# Patient Record
Sex: MALE | Race: WHITE | NOT HISPANIC OR LATINO | URBAN - METROPOLITAN AREA
[De-identification: names, ages, dates, MRNs, and addresses within clinical notes are randomized per-mention and may not be internally consistent; named-entity substitution may affect disease eponyms.]

---

## 2023-07-05 ENCOUNTER — EMERGENCY (EMERGENCY)
Facility: HOSPITAL | Age: 28
LOS: 0 days | Discharge: HOME | End: 2023-07-05
Attending: EMERGENCY MEDICINE
Payer: MEDICAID

## 2023-07-05 VITALS
DIASTOLIC BLOOD PRESSURE: 90 MMHG | TEMPERATURE: 98 F | WEIGHT: 225.09 LBS | HEART RATE: 78 BPM | OXYGEN SATURATION: 97 % | RESPIRATION RATE: 20 BRPM | SYSTOLIC BLOOD PRESSURE: 142 MMHG

## 2023-07-05 VITALS
DIASTOLIC BLOOD PRESSURE: 82 MMHG | SYSTOLIC BLOOD PRESSURE: 132 MMHG | HEART RATE: 71 BPM | TEMPERATURE: 98 F | RESPIRATION RATE: 19 BRPM | OXYGEN SATURATION: 99 %

## 2023-07-05 DIAGNOSIS — M54.40 LUMBAGO WITH SCIATICA, UNSPECIFIED SIDE: ICD-10-CM

## 2023-07-05 DIAGNOSIS — M54.50 LOW BACK PAIN, UNSPECIFIED: ICD-10-CM

## 2023-07-05 PROCEDURE — 99284 EMERGENCY DEPT VISIT MOD MDM: CPT

## 2023-07-05 PROCEDURE — 99283 EMERGENCY DEPT VISIT LOW MDM: CPT

## 2023-07-05 RX ORDER — TIZANIDINE 4 MG/1
2 TABLET ORAL
Qty: 20 | Refills: 0
Start: 2023-07-05 | End: 2023-07-09

## 2023-07-05 NOTE — ED PROVIDER NOTE - ATTENDING APP SHARED VISIT CONTRIBUTION OF CARE
I personally evaluated the patient. I reviewed the Resident´s or Physician Assistant´s note (as assigned above), and agree with the findings and plan except as documented in my note.        28-year-old male presents to the emergency department for persistent right lower back pain worse with mobility and leg movement.  Has been self-medicating and self treating with a home TENS unit and NSAIDs and doing light stretching.  States medications given to him had minimal effect.  No neurologic symptoms.    GENERAL: [male or female] in no distress.   HEENT: EOMI non icteric  CHEST: normal work of breathing noted  CV: pulses intact   EXTR: FROM   Back: Straight leg raise test positive to 5 degrees on the right side, positive to approximately 20 degrees on the left side.  NEURO: AAO 3 no focal deficits  SKIN: normal no pallor  PSYCH: normal mood & mentation    Impression: Sciatica    Plan: Supportive care, modify medications, outpatient follow-up with physical therapy

## 2023-07-05 NOTE — ED PROVIDER NOTE - NSFOLLOWUPINSTRUCTIONS_ED_ALL_ED_FT
Sciatica    WHAT YOU NEED TO KNOW:    Sciatica is a condition that causes pain along your sciatic nerve. The sciatic nerve runs from your spine through both sides of your buttocks. It then runs down the back of your thigh, into your lower leg and foot. Your sciatic nerve may be compressed, inflamed, irritated, or stretched.          DISCHARGE INSTRUCTIONS:    Medicines:     NSAIDs: These medicines decrease swelling and pain. NSAIDs are available without a doctor's order. Ask your healthcare provider which medicine is right for you. Ask how much to take and when to take it. Take as directed. NSAIDs can cause stomach bleeding or kidney problems if not taken correctly.      Acetaminophen: This medicine decreases pain. Acetaminophen is available without a doctor's order. Ask how much to take and when to take it. Follow directions. Acetaminophen can cause liver damage if not taken correctly.      Muscle relaxers help decrease pain and muscle spasms.      Take your medicine as directed. Contact your healthcare provider if you think your medicine is not helping or if you have side effects. Tell him of her if you are allergic to any medicine. Keep a list of the medicines, vitamins, and herbs you take. Include the amounts, and when and why you take them. Bring the list or the pill bottles to follow-up visits. Carry your medicine list with you in case of an emergency.    Follow up with your healthcare provider as directed: Write down your questions so you remember to ask them during your visits.     Manage your symptoms:     Activity: Decrease your activity. Do not lift heavy objects or twist your back for at least 6 weeks. Slowly return to your usual activity.      Ice: Ice helps decrease swelling and pain. Ice may also help prevent tissue damage. Use an ice pack, or put crushed ice in a plastic bag. Cover it with a towel and place it on your low back or leg for 15 to 20 minutes every hour or as directed.      Heat: Heat helps decrease pain and muscle spasms. Apply heat on the area for 20 to 30 minutes every 2 hours for as many days as directed.       Physical therapy: You may need to see physical therapist to teach you exercises to help improve movement and strength, and to decrease pain. An occupational therapist teaches you skills to help with your daily activities.       Use assistive devices if directed: You may need to wear back support, such as a back brace. You may need crutches, a cane, or a walker to decrease stress on your lower back and leg muscles. Ask your healthcare provider for more information about assistive devices and how to use them correctly.    Self-care:     Avoid pressure on your back and legs: Do not lift heavy objects, or stand or sit for long periods of time.      Lift objects safely: Keep your back straight and bend your knees when you  an object. Do not bend or twist your back when you lift.      Maintain a healthy weight: Ask your healthcare provider how much you should weigh. Ask him to help you create a weight loss plan if you are overweight.       Exercise: Ask your healthcare provider about the best stretching, warmup, and exercise plan for you.     Contact your healthcare provider if:     You have pain in your lower back at night or when resting.      You have pain in your lower back with numbness below the knee.      You have weakness in one leg only.      You have questions or concerns about your condition or care.    Return to the emergency department if:     You have trouble holding back your urine or bowel movements.      You have weakness in both legs.      You have numbness in your groin or buttocks.         © Copyright [employer] 2019 All illustrations and images included in CareNotes are the copyrighted property of [employer] or [employer].

## 2023-07-05 NOTE — ED PROVIDER NOTE - PATIENT PORTAL LINK FT
You can access the FollowMyHealth Patient Portal offered by [facility] by registering at the following website: [email or web address]. By joining [employer]’s FollowMyHealth portal, you will also be able to view your health information using other applications (apps) compatible with our system.

## 2023-07-05 NOTE — ED PROVIDER NOTE - PROGRESS NOTE DETAILS
c/w mechanical back pain.  no neuro red flags.  sx are reproducible with position. aorta/cardiac not supported.  imaging not indicated at this time.  r/b of torrey boyd pt. Discussed side effects from NSAIDs and muscle relaxant  Risk GI upset/gastritis/GERD from NSAIDs. May take OTC Prilosec.  Advised not to drive or operate heavy machinery while on Flexeril due to sedation risk. May take only at night if sedation occurs.

## 2023-07-05 NOTE — ED PROVIDER NOTE - OBJECTIVE STATEMENT
pt presents to ED c/o R lower back pain radiating down R leg for 2-3 weeks, worse with position/movement. went to UC and was given steroids, NSAIDs, and muscle relaxant to be taken once daily without relief. Denies incontinence/numbness/saddle parathesia/legs weakness and difficulty on ambulation. denies fever/chill/HA/dizziness/chest pain/sob/abd pain/n/v/d/ urinary sxs

## 2023-07-05 NOTE — ED PROVIDER NOTE - NSFOLLOWUPCLINICS_GEN_ALL_ED_FT
[facility]  Physical Therapy  Multiple Location  [place]   Phone: [phone]  Fax:

## 2023-07-05 NOTE — ED PROVIDER NOTE - CLINICAL SUMMARY MEDICAL DECISION MAKING FREE TEXT BOX
28-year-old male presents to the emergency department for low back pain with sciatic symptoms.  In the emergency department had screening exam, reevaluation, will discharge with change in medications and initiation of physical therapy.  Patient presented seeking additional care with multiple recent visits, mentioned imaging for disc issues.

## 2023-07-05 NOTE — ED ADULT NURSE NOTE - NS ED NURSE LEVEL OF CONSCIOUSNESS ORIENTATION
Bari GOLDSMITH Bari GOLDSMITH Bari GOLDSMITH Brai GOLDSMITH subramamiam Bari GOLDSMITH, Rishabh GOLDSMITH AMAIRANI Matt, PA enma subramamiam Oriented - self; Oriented - place; Oriented - time
